# Patient Record
Sex: FEMALE | URBAN - METROPOLITAN AREA
[De-identification: names, ages, dates, MRNs, and addresses within clinical notes are randomized per-mention and may not be internally consistent; named-entity substitution may affect disease eponyms.]

---

## 2019-06-06 ENCOUNTER — APPOINTMENT (OUTPATIENT)
Age: 19
Setting detail: DERMATOLOGY
End: 2019-06-07

## 2019-06-06 DIAGNOSIS — B35.4 TINEA CORPORIS: ICD-10-CM

## 2019-06-06 PROCEDURE — OTHER EDUCATION: OTHER

## 2019-06-06 PROCEDURE — OTHER PRESCRIPTION: OTHER

## 2019-06-06 PROCEDURE — 99203 OFFICE O/P NEW LOW 30 MIN: CPT

## 2019-06-06 PROCEDURE — OTHER ADDITIONAL NOTES: OTHER

## 2019-06-06 RX ORDER — FLUCONAZOLE 150 MG/1
TABLET ORAL QWEEK
Qty: 5 | Refills: 3 | Status: ERX | COMMUNITY
Start: 2019-06-06

## 2019-06-06 RX ORDER — KETOCONAZOLE 20.5 MG/ML
SHAMPOO, SUSPENSION TOPICAL
Qty: 1 | Refills: 3 | Status: ERX | COMMUNITY
Start: 2019-06-06

## 2019-06-06 RX ORDER — KETOCONAZOLE 20 MG/G
CREAM TOPICAL
Qty: 1 | Refills: 1 | Status: ERX | COMMUNITY
Start: 2019-06-06

## 2019-06-06 RX ORDER — NYSTATIN 100000 [USP'U]/G
POWDER TOPICAL
Qty: 1 | Refills: 1 | Status: ERX | COMMUNITY
Start: 2019-06-06

## 2019-06-06 ASSESSMENT — LOCATION DETAILED DESCRIPTION DERM: LOCATION DETAILED: LEFT ANTERIOR DISTAL THIGH

## 2019-06-06 ASSESSMENT — LOCATION ZONE DERM: LOCATION ZONE: LEG

## 2019-06-06 ASSESSMENT — SEVERITY ASSESSMENT: SEVERITY: MILD TO MODERATE

## 2019-06-06 ASSESSMENT — LOCATION SIMPLE DESCRIPTION DERM: LOCATION SIMPLE: LEFT THIGH

## 2019-06-06 NOTE — HPI: RASH
What Type Of Note Output Would You Prefer (Optional)?: Standard Output
Is This A New Presentation, Or A Follow-Up?: Rash
Additional History: Went away a first time with a prescription antifungal, about two years ago. Was itching terribly when it first came up but now it is still visible but not itching as bad

## 2019-06-06 NOTE — PROCEDURE: ADDITIONAL NOTES
Additional Notes: Patient has been using clotrimazole OTC and states it helps slightly but does not cure it. Will recheck in 2 weeks.
Detail Level: Simple

## 2019-06-20 ENCOUNTER — APPOINTMENT (OUTPATIENT)
Age: 19
Setting detail: DERMATOLOGY
End: 2019-06-21

## 2019-06-20 DIAGNOSIS — B35.4 TINEA CORPORIS: ICD-10-CM

## 2019-06-20 PROCEDURE — OTHER ADDITIONAL NOTES: OTHER

## 2019-06-20 PROCEDURE — OTHER EDUCATION: OTHER

## 2019-06-20 PROCEDURE — OTHER PRESCRIPTION: OTHER

## 2019-06-20 PROCEDURE — 99213 OFFICE O/P EST LOW 20 MIN: CPT

## 2019-06-20 RX ORDER — HYDROCORTISONE 25 MG/G
CREAM TOPICAL
Qty: 1 | Refills: 1 | Status: ERX | COMMUNITY
Start: 2019-06-20

## 2019-06-20 RX ORDER — KETOCONAZOLE 20 MG/G
CREAM TOPICAL
Qty: 1 | Refills: 1 | Status: ERX

## 2019-06-20 ASSESSMENT — LOCATION ZONE DERM: LOCATION ZONE: LEG

## 2019-06-20 ASSESSMENT — LOCATION DETAILED DESCRIPTION DERM: LOCATION DETAILED: LEFT ANTERIOR DISTAL THIGH

## 2019-06-20 ASSESSMENT — LOCATION SIMPLE DESCRIPTION DERM: LOCATION SIMPLE: LEFT THIGH

## 2019-06-20 NOTE — PROCEDURE: ADDITIONAL NOTES
Detail Level: Simple
Additional Notes: Patient states she does not see a huge improvement and started diflucan yesterday. Instructed patient to continue regimen and call tomorrow for update.

## 2019-07-03 ENCOUNTER — APPOINTMENT (OUTPATIENT)
Age: 19
Setting detail: DERMATOLOGY
End: 2019-07-07

## 2019-07-03 DIAGNOSIS — B35.4 TINEA CORPORIS: ICD-10-CM

## 2019-07-03 PROCEDURE — OTHER EDUCATION: OTHER

## 2019-07-03 PROCEDURE — OTHER ADDITIONAL NOTES: OTHER

## 2019-07-03 PROCEDURE — 99214 OFFICE O/P EST MOD 30 MIN: CPT

## 2019-07-03 ASSESSMENT — LOCATION DETAILED DESCRIPTION DERM: LOCATION DETAILED: LEFT ANTERIOR DISTAL THIGH

## 2019-07-03 ASSESSMENT — LOCATION ZONE DERM: LOCATION ZONE: LEG

## 2019-07-03 ASSESSMENT — LOCATION SIMPLE DESCRIPTION DERM: LOCATION SIMPLE: LEFT THIGH

## 2019-07-03 ASSESSMENT — SEVERITY ASSESSMENT: SEVERITY: ALMOST CLEAR

## 2019-08-01 ENCOUNTER — APPOINTMENT (OUTPATIENT)
Age: 19
Setting detail: DERMATOLOGY
End: 2019-08-01

## 2019-08-01 DIAGNOSIS — B35.4 TINEA CORPORIS: ICD-10-CM

## 2019-08-01 PROCEDURE — OTHER ADDITIONAL NOTES: OTHER

## 2019-08-01 PROCEDURE — 99212 OFFICE O/P EST SF 10 MIN: CPT

## 2019-08-01 PROCEDURE — OTHER EDUCATION: OTHER

## 2019-08-01 ASSESSMENT — LOCATION ZONE DERM: LOCATION ZONE: LEG

## 2019-08-01 ASSESSMENT — LOCATION DETAILED DESCRIPTION DERM: LOCATION DETAILED: LEFT ANTERIOR DISTAL THIGH

## 2019-08-01 ASSESSMENT — LOCATION SIMPLE DESCRIPTION DERM: LOCATION SIMPLE: LEFT THIGH

## 2019-08-01 NOTE — PROCEDURE: ADDITIONAL NOTES
Additional Notes: No refills needed at this time. Instructed patient to return if flared again.\\nPatient got TriXera from [facility] for dry legs.
Detail Level: Simple